# Patient Record
Sex: MALE | URBAN - NONMETROPOLITAN AREA
[De-identification: names, ages, dates, MRNs, and addresses within clinical notes are randomized per-mention and may not be internally consistent; named-entity substitution may affect disease eponyms.]

---

## 2023-10-18 ENCOUNTER — PROCEDURE VISIT (OUTPATIENT)
Dept: ENT CLINIC | Age: 78
End: 2023-10-18
Payer: OTHER GOVERNMENT

## 2023-10-18 DIAGNOSIS — H90.3 SENSORINEURAL HEARING LOSS (SNHL) OF BOTH EARS: Primary | ICD-10-CM

## 2023-10-18 PROCEDURE — 92567 TYMPANOMETRY: CPT | Performed by: AUDIOLOGIST

## 2023-10-18 PROCEDURE — 92557 COMPREHENSIVE HEARING TEST: CPT | Performed by: AUDIOLOGIST

## 2023-10-18 NOTE — PROGRESS NOTES
History   Rick Day is a 78 y.o. male who was referred by the [facility] for a hearing evaluation. He reported decreased hearing, worse in the right ear, and bilateral tinnitus. He noted he has trouble understanding if people aren't facing him when he talks. He reported symptoms have been gradually gotten worse since his time in the [employer]. He reported about 6-7 months ago he had a right ear infection. He reported history of seasonal allergies. Summary   Tympanometry consistent with normal TM mobility bilaterally. Pure tone testing indicates mild to severe SNHL bilaterally. Word recognition scores were excellent bilaterally. Based on degree of hearing loss, binaural hearing aids are recommended. Results   Otoscopy:   Right: Clear EAC/Normal TM  Left: Clear EAC/Normal TM    Audiometry:   Right:  Mild to severe  sloping SNHL  Left:  Mild to severe  sloping SNHL         Tympanometry:    Right: Type A  Left: Type A      Plan   Results of today's testing were discussed with Mr. Kimberly Solis and the following recommendations were made: [facility]. Monitor hearing yearly, sooner with changes. Hearing protection as warranted.          Audiogram and Acoustic Immittance